# Patient Record
(demographics unavailable — no encounter records)

---

## 2024-11-12 NOTE — CONSULT LETTER
[Dear  ___] : Dear  [unfilled], [Consult Letter:] : I had the pleasure of evaluating your patient, [unfilled]. [( Thank you for referring [unfilled] for consultation for _____ )] : Thank you for referring [unfilled] for consultation for [unfilled] [Please see my note below.] : Please see my note below. [Consult Closing:] : Thank you very much for allowing me to participate in the care of this patient.  If you have any questions, please do not hesitate to contact me. [Sincerely,] : Sincerely, [FreeTextEntry3] : Sondra Olmedo MD Director, Pediatric Endocrinology [facility], [facility]  of Pediatrics  [facility] [employer] at [facility]

## 2024-11-12 NOTE — PAST MEDICAL HISTORY
[At Term] : at term [Normal Vaginal Route] : by normal vaginal route [None] : there were no delivery complications [Age Appropriate] : age appropriate developmental milestones met [de-identified] : Born in [place], there until 7yo [FreeTextEntry1] : 5806 [FreeTextEntry4] : nuchal cord x3 but was fine

## 2024-11-12 NOTE — FAMILY HISTORY
[___ cm] : [unfilled] centimeters [de-identified] : Elisabet [FreeTextEntry1] : Elisabet [FreeTextEntry5] : 14 [FreeTextEntry2] : 5yo brother healthy but hypotonia, seeing neurology

## 2024-11-12 NOTE — REASON FOR VISIT
[Consultation] : a consultation visit [Patient] : patient [Mother] : mother [Time Spent: ____ minutes] : Total time spent using  services: [unfilled] minutes. The patient's primary language is not English thus required  services. [Interpreters_IDNumber] : [id number] [Interpreters_FullName] : Norma [TWNoteComboBox1] : Kyr[place]z

## 2024-11-12 NOTE — DATA REVIEWED
[FreeTextEntry1] : Growth records reviewed Height 50th Weight 50-75th  Labs 9/1/24 (Miquel Piña) TSH 5.59 (inc) FT4 1.2 CBC nl CMP nl CRP 0.3  (inc) Lyme Ab screen neg JASON neg

## 2024-11-12 NOTE — PHYSICAL EXAM
[Healthy Appearing] : healthy appearing [Well Nourished] : well nourished [Interactive] : interactive [Well formed] : well formed [Normally Set] : normally set [WNL for age] : within normal limits of age [Normal S1 and S2] : normal S1 and S2 [Clear to Ausculation Bilaterally] : clear to auscultation bilaterally [Abdomen Soft] : soft [Abdomen Tenderness] : non-tender [] : no hepatosplenomegaly [1] : was Reinier stage 1 [Normal Appearance] : normal in appearance [Reinier Stage ___] : the Reinier stage for breast development was [unfilled] [Normal] : normal  [Dysmorphic] : non-dysmorphic [Goiter] : no goiter [Murmur] : no murmurs [de-identified] : nl oropharynx [FreeTextEntry2] : no axillary hair

## 2024-11-12 NOTE — REVIEW OF SYSTEMS
[Nl] : Neurological [Constipation] : constipation [Change in Activity] : no change in activity [Change in Vision] : no change in vision  [Headache] : no headache [Cold Intolerance] : cold tolerant

## 2024-11-12 NOTE — HISTORY OF PRESENT ILLNESS
[Premenarchal] : premenarchal [FreeTextEntry2] : 8y1m F for evaluation of thyroid.  Labs done for routine physical, at that time also complaining of knee pain so did bloodwork.  TSH elevated and says was also elevated last year.  Good energy levels.  No hair loss.  No cold intolerance.  Some constipation since a baby, depends on her eating, No issues academically or with focusing.  Eats a variety of foods.  Use iodinized salt.  Daily vitamin D.  Not much soy. Generally healthy, though mom feels when lived in [place] more active and since in  less so and more frequent illnesses. [TWNoteComboBox1] : abnormal thyroid function

## 2024-11-12 NOTE — DISCUSSION/SUMMARY
[FreeTextEntry1] : BEATRICE has a slightly elevated TSH. There are 2 possibilities. 1. Since the normal range for all tests defines the 95% confidence interval, it is expected that 5% of normal individuals will have values outside of the normal range. Therefore BEATRICE may be one of these 5% of individuals. The absence of goiter suggests that this is a possibility. 2. This could represent early hypothyroidism. In this case I would expect the presence of anti-thyroid antibodies and would expect TSH to rise with time. Today I repeated the TFTs with thyroid autoantibodies and further work-up will depend on the results of these tests.